# Patient Record
Sex: FEMALE | Race: WHITE | NOT HISPANIC OR LATINO | Employment: FULL TIME | URBAN - METROPOLITAN AREA
[De-identification: names, ages, dates, MRNs, and addresses within clinical notes are randomized per-mention and may not be internally consistent; named-entity substitution may affect disease eponyms.]

---

## 2019-09-04 ENCOUNTER — OFFICE VISIT (OUTPATIENT)
Dept: OBSTETRICS AND GYNECOLOGY | Facility: CLINIC | Age: 39
End: 2019-09-04

## 2019-09-04 ENCOUNTER — APPOINTMENT (OUTPATIENT)
Dept: LAB | Facility: HOSPITAL | Age: 39
End: 2019-09-04

## 2019-09-04 VITALS
HEIGHT: 65 IN | SYSTOLIC BLOOD PRESSURE: 118 MMHG | DIASTOLIC BLOOD PRESSURE: 70 MMHG | WEIGHT: 163 LBS | BODY MASS INDEX: 27.16 KG/M2

## 2019-09-04 DIAGNOSIS — N92.6 IRREGULAR PERIODS/MENSTRUAL CYCLES: ICD-10-CM

## 2019-09-04 DIAGNOSIS — Z01.419 ENCOUNTER FOR WELL WOMAN EXAM WITH ROUTINE GYNECOLOGICAL EXAM: Primary | ICD-10-CM

## 2019-09-04 DIAGNOSIS — F17.210 CIGARETTE SMOKER: ICD-10-CM

## 2019-09-04 DIAGNOSIS — N95.1 MENOPAUSAL SYMPTOMS: ICD-10-CM

## 2019-09-04 PROBLEM — J45.909 ASTHMA: Status: ACTIVE | Noted: 2019-09-04

## 2019-09-04 LAB
FSH SERPL-ACNC: 70.9 MIU/ML
LH SERPL-ACNC: 51 MIU/ML
TSH SERPL DL<=0.05 MIU/L-ACNC: 2.36 UIU/ML (ref 0.27–4.2)

## 2019-09-04 PROCEDURE — 36415 COLL VENOUS BLD VENIPUNCTURE: CPT | Performed by: OBSTETRICS & GYNECOLOGY

## 2019-09-04 PROCEDURE — 99385 PREV VISIT NEW AGE 18-39: CPT | Performed by: OBSTETRICS & GYNECOLOGY

## 2019-09-04 PROCEDURE — 84443 ASSAY THYROID STIM HORMONE: CPT | Performed by: OBSTETRICS & GYNECOLOGY

## 2019-09-04 PROCEDURE — 83002 ASSAY OF GONADOTROPIN (LH): CPT | Performed by: OBSTETRICS & GYNECOLOGY

## 2019-09-04 PROCEDURE — 83001 ASSAY OF GONADOTROPIN (FSH): CPT | Performed by: OBSTETRICS & GYNECOLOGY

## 2019-09-04 PROCEDURE — 99406 BEHAV CHNG SMOKING 3-10 MIN: CPT | Performed by: OBSTETRICS & GYNECOLOGY

## 2019-09-04 RX ORDER — CETIRIZINE HYDROCHLORIDE 10 MG/1
TABLET ORAL
Refills: 1 | COMMUNITY
Start: 2019-07-30

## 2019-09-04 RX ORDER — MONTELUKAST SODIUM 10 MG/1
TABLET ORAL
Refills: 0 | COMMUNITY
Start: 2019-07-30

## 2019-09-04 RX ORDER — VARENICLINE TARTRATE 0.5 MG/1
0.5 TABLET, FILM COATED ORAL 2 TIMES DAILY
Qty: 60 TABLET | Refills: 2 | Status: SHIPPED | OUTPATIENT
Start: 2019-09-04 | End: 2020-12-08

## 2019-09-04 RX ORDER — FLUTICASONE PROPIONATE 50 MCG
SPRAY, SUSPENSION (ML) NASAL
Refills: 6 | COMMUNITY
Start: 2019-06-20

## 2019-09-04 RX ORDER — ALBUTEROL SULFATE 90 UG/1
AEROSOL, METERED RESPIRATORY (INHALATION)
Refills: 5 | COMMUNITY
Start: 2019-07-22

## 2019-09-04 NOTE — PROGRESS NOTES
Subjective   Chief Complaint   Patient presents with   • Annual Exam     Roxi Simon is a 39 y.o. year old  presenting to be seen for her annual exam.    Current birth control method: Tubal / salpingectomy.  She lives in [facility] currently works at the [employer] very active.  She had seen Dr. Toribio in the past he did her first  section at 36 weeks in  .  She is here for annual examination but also complaining of symptomatic hot flashes night sweats insomnia.  She is had may be light flow every 6 months for the last 18 months.  Fairly light.  Reports last Pap smear was 2016 never had a mammogram.    Patient's last menstrual period was 2019.    She is sexually active.   Condoms are not typically used.    [place] is painful or she is having problems :no  She has concerns about domestic violence: no.    Cycle Frequency: irregular  infrequent   Menstrual cycle character: flow is typically light and flow is typically normal   Cycle Duration: 1 - 5   Number of heavy days of flows: 0   Intermenstrual bleeding present: no   Post-coital bleeding present: no     She exercises regularly: no.  Self breast awareness:no no family history of breast cancer.    Calcium intake: is not adequate.1  Caffeine intake: no or mild caffeine use  Social History    Tobacco Use      Smoking status: Current Every Day Smoker        Packs/day: 1.50      Smokeless tobacco: Never Used    Social History     Substance and Sexual Activity   Alcohol Use Yes   • Frequency: 4 or more times a week    Comment: 6 drinks per week 2019   She does report she has not drank much in the last month or so.  She is .  Lives in [facility] and works at the [employer] here in [facility]    The following portions of the patient's history were reviewed and updated as is  appropriate:She  has a past medical history of Anxiety, Arthritis, Asthma, Depression, and Hypertension.  She does  not have any pertinent problems on file.  She  has a past surgical history that includes  section with tubal (2016) and  section, low transverse (2010).  Her family history includes Ovarian cancer in her maternal grandmother.  She  reports that she has been smoking.  She has been smoking about 1.50 packs per day. She has never used smokeless tobacco. She reports that she drinks alcohol. She reports that she does not use drugs.  She is allergic to zofran [ondansetron hcl]..    Current Outpatient Medications:   •  cetirizine (zyrTEC) 10 MG tablet, , Disp: , Rfl: 1  •  fluticasone (FLONASE) 50 MCG/ACT nasal spray, , Disp: , Rfl: 6  •  montelukast (SINGULAIR) 10 MG tablet, , Disp: , Rfl: 0  •  VENTOLIN  (90 Base) MCG/ACT inhaler, , Disp: , Rfl: 5  •  varenicline (CHANTIX) 0.5 MG tablet, Take 1 tablet by mouth 2 (Two) Times a Day., Disp: 60 tablet, Rfl: 2    Review of systems  Constitutional    POS weight gain she reports she is gained about 43 pounds in the last 6 months without changing diet or activity.                            NEG anorexia, fevers or night sweats  Breast                POS nothing reported                            NEG persistent breast lump, skin dimpling, breast tenderness or nipple discharge  GI                      POS constipation (chronic)                            NEG bloating, change in bowel habits, melena or reflux symptoms                       POS nothing reported                            NEG dysuria, frequency or hematuria  Her GYN questionnaire was negative for STD, endometriosis cysts, fibroids sexual abuse, abnormal Pap smear or tubal infections or pregnancy.  Obstetrical history 2 vaginal deliveries followed by 2  sections  Medical history positive for asthma/COPD hypertension depression/anxiety and arthritis.  Negative diabetes, seizures, high cholesterol, thyroid  System review positive for cough constipation chest pain headaches and bruising  "negative for nosebleeds fatigue rashes sadness urinary leakage or double vision     Objective   /70   Ht 163.8 cm (64.5\")   Wt 73.9 kg (163 lb)   LMP 07/16/2019   Breastfeeding? No   BMI 27.55 kg/m²       General:  well developed; well nourished  no acute distress  appears stated age   Skin:  No suspicious lesions seen   Thyroid:  No thyromegaly   Breasts:  Examined in supine position  Symmetric without masses or skin dimpling  Nipples normal without inversion, lesions or discharge  Fibrocystic changes are present both breasts without a discrete mass   Abdomen: soft, non-tender; no masses  no umbilical or inguinal hernias are present  no hepato-splenomegaly   Pelvis: Clinical staff was present for exam  External genitalia:  normal appearance of the external genitalia including Bartholin's and [place]'s glands.  :  urethral meatus normal;  Vaginal:  normal pink mucosa without prolapse or lesions.  Cervix:  normal appearance. Pap obtained  Uterus:  normal size, shape and consistency.  Adnexa:  normal bimanual exam of the adnexa.       Lab Review   UA and Urine culture    Imaging  No data reviewed       Assessment     1. Annual GYN examination.  She is having some menopausal symptoms with hot flashes night sweats insomnia.  Pap testing done to get that caught up-to-date.  2. Irregular cycles could be postmenopausal perimenopausal or thyroid given constipation and weight gain.  Will check TSH  3. She does not do self breast examination.  Discussed she can start mammograms at age 40-45 or 50.  4. Family history of ovarian cancer maternal grandmother.  Ask her to check with her mother to see if any BRCA 1 or 2 testing has been done.  Discussed most of the time this is random and not familial.  5. 1 pack/day smoker and she is asthmatic.  Health implications discussed along with cost of cigarettes.  Spent 5 minutes discussing smoking cessation options.  She is only tried to stop on her own she is never used any " gums over-the-counter patches or Chantix.  6. She reports she has gained about 43 pounds over the last 6 months.             Plan     1. Annual examination or sooner as needed  2. 1000 mg calcium in divided doses ideally in diet; regular exercise  3. Self breast awareness if appropriate  4.    Discussed using Chantix once a day for about 5 days and twice a day.  If she needs a higher dose we can give that to her.            New Medications Ordered This Visit   Medications   • varenicline (CHANTIX) 0.5 MG tablet     Sig: Take 1 tablet by mouth 2 (Two) Times a Day.     Dispense:  60 tablet     Refill:  2     Orders Placed This Encounter   Procedures   • TSH   • Luteinizing Hormone   • Follicle Stimulating Hormone   • Ambulatory Referral to Weight Management Program     Referral Priority:   Routine     Referral Type:   Health Education     Referred to Provider:   Akua Inman APRN     Number of Visits Requested:   1           This note was electronically signed.    Jamie Wylie MD  9/4/2019

## 2019-09-05 RX ORDER — MEDROXYPROGESTERONE ACETATE 10 MG/1
10 TABLET ORAL DAILY
Qty: 12 TABLET | Refills: 0 | Status: SHIPPED | OUTPATIENT
Start: 2019-09-05 | End: 2020-12-08

## 2019-09-05 NOTE — PROGRESS NOTES
Let her know her FSH/LH were high indicating ovulation or post menopause. I'll send in provera to take for 12 days - if no withdrawal bleeding within a week of the last pill taken- she is PM  If so I would suggest hormone replacement for symptom relief at her young age, thanks

## 2019-09-10 PROBLEM — N72 HIGH RISK HUMAN PAPILLOMA VIRUS (HPV) INFECTION OF CERVIX: Status: ACTIVE | Noted: 2019-09-10

## 2019-09-10 PROBLEM — B97.7 HIGH RISK HUMAN PAPILLOMA VIRUS (HPV) INFECTION OF CERVIX: Status: ACTIVE | Noted: 2019-09-10

## 2020-12-08 ENCOUNTER — OFFICE VISIT (OUTPATIENT)
Dept: OBSTETRICS AND GYNECOLOGY | Facility: CLINIC | Age: 40
End: 2020-12-08

## 2020-12-08 VITALS
DIASTOLIC BLOOD PRESSURE: 70 MMHG | SYSTOLIC BLOOD PRESSURE: 114 MMHG | WEIGHT: 170 LBS | HEIGHT: 65 IN | BODY MASS INDEX: 28.32 KG/M2

## 2020-12-08 DIAGNOSIS — N72 HIGH RISK HUMAN PAPILLOMA VIRUS (HPV) INFECTION OF CERVIX: ICD-10-CM

## 2020-12-08 DIAGNOSIS — N92.6 IRREGULAR PERIODS/MENSTRUAL CYCLES: ICD-10-CM

## 2020-12-08 DIAGNOSIS — B97.7 HIGH RISK HUMAN PAPILLOMA VIRUS (HPV) INFECTION OF CERVIX: ICD-10-CM

## 2020-12-08 DIAGNOSIS — Z01.419 ENCOUNTER FOR GYNECOLOGICAL EXAMINATION WITHOUT ABNORMAL FINDING: Primary | ICD-10-CM

## 2020-12-08 PROCEDURE — 99396 PREV VISIT EST AGE 40-64: CPT | Performed by: OBSTETRICS & GYNECOLOGY

## 2020-12-08 RX ORDER — MEDROXYPROGESTERONE ACETATE 10 MG/1
10 TABLET ORAL DAILY
Qty: 10 TABLET | Refills: 5 | Status: SHIPPED | OUTPATIENT
Start: 2020-12-08

## 2020-12-08 RX ORDER — IPRATROPIUM BROMIDE AND ALBUTEROL SULFATE 2.5; .5 MG/3ML; MG/3ML
SOLUTION RESPIRATORY (INHALATION)
COMMUNITY
Start: 2020-11-12

## 2020-12-08 NOTE — PROGRESS NOTES
Subjective   Chief Complaint   Patient presents with   • Annual Exam     Roxi Simon is a 40 y.o. year old  presenting to be seen for her annual exam.    Current birth control method: Tubal / salpingectomy.    Patient's last menstrual period was 2020. only one the month before since I saw her last Sept she had a normal TSH level at that time and then the elevated FSH and LH.  Discussed occasionally there may be less gas cycle.    She also did not use her was not successful with Chantix.  She is a asthmatic and has an inhaler nebulizer and also smokes about a pack cigarettes per day.    She is sexually active.   Condoms are not typically used.    STDs and sexual behavior discussed.  [place] is painful or she is having problems :no  She has concerns about domestic violence: no.    Cycle Frequency: irregular  infrequent   Menstrual cycle character: flow is typically moderately heavy   Cycle Duration: 8 - 9   Number of heavy days of flows: 6   Intermenstrual bleeding present: no   Post-coital bleeding present: no     She exercises regularly: no.lungs cant handle but she smokes  Discussed maintaining healthy weight and nutrition and injury avoidance  Self breast awareness:yes    Calcium intake: is not adequate.1  Caffeine intake: caffeine use is moderate-to-high daily  Social History    Tobacco Use      Smoking status: Current Every Day Smoker        Packs/day: 0.50      Smokeless tobacco: Never Used    Social History     Substance and Sexual Activity   Alcohol Use Not Currently   • Frequency: 4 or more times a week      Discussed avoidance of illicit drugs    The following portions of the patient's history were reviewed and updated as is  appropriate:problem list, current medications, allergies, past family history, past medical history, past social history and past surgical history.    Current Outpatient Medications:   •  cetirizine (zyrTEC) 10 MG tablet, , Disp: , Rfl: 1  •  fluticasone (FLONASE)  "50 MCG/ACT nasal spray, , Disp: , Rfl: 6  •  ipratropium-albuterol (DUO-NEB) 0.5-2.5 mg/3 ml nebulizer, , Disp: , Rfl:   •  montelukast (SINGULAIR) 10 MG tablet, , Disp: , Rfl: 0  •  VENTOLIN  (90 Base) MCG/ACT inhaler, , Disp: , Rfl: 5  •  medroxyPROGESTERone (Provera) 10 MG tablet, Take 1 tablet by mouth Daily. 1 nightly cycle day 16 through 25, Disp: 10 tablet, Rfl: 5    Discussed risk factors for hypertension, hyperlipidemia coronary heart disease.    Review of systems    Constitutional    POS weight gain                            NEG anorexia, chills, malaise or night sweats  Breast                POS nothing reported                            NEG persistent breast lump, skin dimpling, breast tenderness or nipple discharge  GI                      POS constipation (chronic) versus diarrhea                            NEG bloating, change in bowel habits, melena or reflux symptoms                       POS nothing reported                            NEG dysuria, frequency or hematuria         Objective   /70   Ht 163.8 cm (64.5\")   Wt 77.1 kg (170 lb)   LMP 11/26/2020   Breastfeeding No   BMI 28.73 kg/m²       General:  well developed; well nourished  no acute distress  appears stated age   Skin:  No suspicious lesions seen   Thyroid:    Breasts:  Examined in supine position  Symmetric without masses or skin dimpling  Nipples normal without inversion, lesions or discharge  Fibrocystic changes are present both breasts without a discrete mass   Abdomen: soft, non-tender; no masses  no umbilical or inguinal hernias are present  no hepato-splenomegaly   Pelvis: Clinical staff was present for exam  External genitalia:  normal appearance of the external genitalia including Bartholin's and [place]'s glands.  :  urethral meatus normal;  Vaginal:  normal pink mucosa without prolapse or lesions.  Uterus:  normal size, shape and consistency. anteverted;  Adnexa:  normal bimanual exam of the " adnexa.  Rectal:  digital rectal exam not performed; anus visually normal appearing.       Lab Review   FSH, Pap test and TSH    Imaging  No data reviewed               Assessment     1. Irregular cycles  2. History of nonsixteen/18 high risk HPV.  Suggested repeating 2022  3. History of asthma 1 pack/day smoker             Plan     1. Annual examination or sooner as needed  2. Provera cycle days 16 through 25 chart cycles; she will call if she has any irregular bleeding we will order an ultrasound and/or endometrial biopsy  3. 1000 mg calcium in divided doses ideally in diet; regular exercise  4. Self breast awareness discussed, mammogram protocols discussed  4.    Quit smoking cigarettes            New Medications Ordered This Visit   Medications   • medroxyPROGESTERone (Provera) 10 MG tablet     Sig: Take 1 tablet by mouth Daily. 1 nightly cycle day 16 through 25     Dispense:  10 tablet     Refill:  5     No orders of the defined types were placed in this encounter.          This note was electronically signed.    Jamie Wylie MD  12/8/2020

## 2022-01-24 ENCOUNTER — LAB (OUTPATIENT)
Dept: LAB | Facility: HOSPITAL | Age: 42
End: 2022-01-24

## 2022-01-24 ENCOUNTER — OFFICE VISIT (OUTPATIENT)
Dept: OBSTETRICS AND GYNECOLOGY | Facility: CLINIC | Age: 42
End: 2022-01-24

## 2022-01-24 VITALS
HEIGHT: 66 IN | DIASTOLIC BLOOD PRESSURE: 80 MMHG | BODY MASS INDEX: 28.51 KG/M2 | WEIGHT: 177.4 LBS | SYSTOLIC BLOOD PRESSURE: 116 MMHG

## 2022-01-24 DIAGNOSIS — B97.7 HPV IN FEMALE: ICD-10-CM

## 2022-01-24 DIAGNOSIS — Z01.411 ENCOUNTER FOR GYNECOLOGICAL EXAMINATION WITH ABNORMAL FINDING: Primary | ICD-10-CM

## 2022-01-24 DIAGNOSIS — Z12.31 ENCOUNTER FOR SCREENING MAMMOGRAM FOR MALIGNANT NEOPLASM OF BREAST: ICD-10-CM

## 2022-01-24 DIAGNOSIS — Z01.411 ENCOUNTER FOR GYNECOLOGICAL EXAMINATION WITH ABNORMAL FINDING: ICD-10-CM

## 2022-01-24 DIAGNOSIS — R63.5 WEIGHT GAIN: ICD-10-CM

## 2022-01-24 DIAGNOSIS — N95.0 PMB (POSTMENOPAUSAL BLEEDING): ICD-10-CM

## 2022-01-24 LAB
ALBUMIN SERPL-MCNC: 4.7 G/DL (ref 3.5–5.2)
ALBUMIN/GLOB SERPL: 1.7 G/DL
ALP SERPL-CCNC: 122 U/L (ref 39–117)
ALT SERPL W P-5'-P-CCNC: 41 U/L (ref 1–33)
ANION GAP SERPL CALCULATED.3IONS-SCNC: 10.2 MMOL/L (ref 5–15)
AST SERPL-CCNC: 19 U/L (ref 1–32)
BILIRUB SERPL-MCNC: 0.3 MG/DL (ref 0–1.2)
BUN SERPL-MCNC: 17 MG/DL (ref 6–20)
BUN/CREAT SERPL: 20.7 (ref 7–25)
CALCIUM SPEC-SCNC: 9.6 MG/DL (ref 8.6–10.5)
CHLORIDE SERPL-SCNC: 100 MMOL/L (ref 98–107)
CO2 SERPL-SCNC: 28.8 MMOL/L (ref 22–29)
CREAT SERPL-MCNC: 0.82 MG/DL (ref 0.57–1)
DEPRECATED RDW RBC AUTO: 41.6 FL (ref 37–54)
ERYTHROCYTE [DISTWIDTH] IN BLOOD BY AUTOMATED COUNT: 12 % (ref 12.3–15.4)
ESTRADIOL SERPL HS-MCNC: 69.3 PG/ML
FSH SERPL-ACNC: 23.7 MIU/ML
GFR SERPL CREATININE-BSD FRML MDRD: 77 ML/MIN/1.73
GLOBULIN UR ELPH-MCNC: 2.8 GM/DL
GLUCOSE SERPL-MCNC: 88 MG/DL (ref 65–99)
HCT VFR BLD AUTO: 46.1 % (ref 34–46.6)
HGB BLD-MCNC: 15.8 G/DL (ref 12–15.9)
MCH RBC QN AUTO: 32.6 PG (ref 26.6–33)
MCHC RBC AUTO-ENTMCNC: 34.3 G/DL (ref 31.5–35.7)
MCV RBC AUTO: 95.1 FL (ref 79–97)
PLATELET # BLD AUTO: 454 10*3/MM3 (ref 140–450)
PMV BLD AUTO: 10 FL (ref 6–12)
POTASSIUM SERPL-SCNC: 3.8 MMOL/L (ref 3.5–5.2)
PROT SERPL-MCNC: 7.5 G/DL (ref 6–8.5)
RBC # BLD AUTO: 4.85 10*6/MM3 (ref 3.77–5.28)
SODIUM SERPL-SCNC: 139 MMOL/L (ref 136–145)
TSH SERPL DL<=0.05 MIU/L-ACNC: 3.73 UIU/ML (ref 0.27–4.2)
VIT B12 BLD-MCNC: 514 PG/ML (ref 211–946)
WBC NRBC COR # BLD: 12.21 10*3/MM3 (ref 3.4–10.8)

## 2022-01-24 PROCEDURE — 84443 ASSAY THYROID STIM HORMONE: CPT

## 2022-01-24 PROCEDURE — 82670 ASSAY OF TOTAL ESTRADIOL: CPT

## 2022-01-24 PROCEDURE — 36415 COLL VENOUS BLD VENIPUNCTURE: CPT

## 2022-01-24 PROCEDURE — 83001 ASSAY OF GONADOTROPIN (FSH): CPT

## 2022-01-24 PROCEDURE — 99396 PREV VISIT EST AGE 40-64: CPT | Performed by: NURSE PRACTITIONER

## 2022-01-24 PROCEDURE — 80053 COMPREHEN METABOLIC PANEL: CPT

## 2022-01-24 PROCEDURE — 85027 COMPLETE CBC AUTOMATED: CPT

## 2022-01-24 PROCEDURE — 82607 VITAMIN B-12: CPT

## 2022-01-24 RX ORDER — DEXTROMETHORPHAN HYDROBROMIDE AND PROMETHAZINE HYDROCHLORIDE 15; 6.25 MG/5ML; MG/5ML
SYRUP ORAL
COMMUNITY
Start: 2021-12-23

## 2022-01-24 RX ORDER — DILTIAZEM HYDROCHLORIDE 60 MG/1
TABLET, FILM COATED ORAL
COMMUNITY
Start: 2022-01-15

## 2022-01-24 NOTE — PROGRESS NOTES
"  Annual Visit     Patient Name: Roxi Simon  : 1980   MRN: [id number]   Care Team: Patient Care Team:  Provider, No Known as PCP - General    Chief Complaint:    Chief Complaint   Patient presents with   • Annual Exam     no c/o       HPI: Roxi Simon is a 41 y.o. year old  presenting to be seen for her gynecologic exam.   S/p BTL     Smoker     States she hadn't had a period in 1.5 yrs until  - she had period like flow with clots x 5 days   FSH in 2019 = 70.9   Hasn't had labs in the last year   No pelvic pain, dyspareunia, or postcoital bldg   No vaginal c/o     Pap smear  WNL with HPV non 16/18 positive     Hasn't had a mammogram yet     Concerned about weight gain   States she has gained about 60 lbs in the last 2 yrs  States even if she is very careful with her intake, she still gains weight     Subjective      /80 (BP Location: Right arm, Patient Position: Sitting, Cuff Size: Adult)   Ht 167.6 cm (66\")   Wt 80.5 kg (177 lb 6.4 oz)   Breastfeeding No   BMI 28.63 kg/m²     BMI reviewed: Body mass index is 28.63 kg/m².      Objective     Physical Exam    Neuro: alert and oriented to person, place and time   General:  alert; cooperative; well developed; well nourished   Skin:  No suspicious lesions seen   Thyroid: normal to inspection and palpation   Lungs:  breathing is unlabored  clear to auscultation bilaterally   Heart:  regular rate and rhythm, S1, S2 normal, no murmur, click, rub or gallop  normal apical impulse   Breasts:  Examined in supine position  Symmetric without masses or skin dimpling  Nipples normal without inversion, lesions or discharge  There are no palpable axillary nodes   Abdomen: soft, non-tender; no masses  no umbilical or inguinal hernias are present  no hepato-splenomegaly   Pelvis: Clinical staff was present for exam  External genitalia:  normal appearance of the external genitalia including Bartholin's and [place]'s glands.  :  urethral meatus " normal;  Vaginal:  normal pink mucosa without prolapse or lesions.  Cervix:  normal appearance.  Uterus:  normal size, shape and consistency. tenderness to palpation is present;  Adnexa:  normal bimanual exam of the adnexa.  Rectal:  digital rectal exam not performed; anus visually normal appearing.         Assessment / Plan      Assessment  Problems Addressed This Visit    ICD-10-CM ICD-9-CM   1. Encounter for gynecological examination with abnormal finding  Z01.411 V72.31   2. 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 PMB (postmenopausal bleeding)  N95.0 627.1   4. Weight gain  R63.5 783.1   5. Encounter for screening mammogram for malignant neoplasm of breast  Z12.31 V76.12       Plan    Pap smear pending   Reviewed pap smear results from 2019     Discussed monthly SBEs   Mammogram ordered    Reviewed menopausal FSH from 2019   And 12 consecutive months of amenorrhea confirms menopause, so she should no longer have bldg - this is considered PMB and must be evaluated   Pelvic u/s ordered with labs   FSH, estradiol, TSH ordered   CBC and CMP ordered as well     Check B12 d/t concerns of weight gain     Will call to discuss pelvic u/s report and lab results and determine final POC     AV 1 yr           Follow Up  Return in about 1 year (around 1/24/2023) for Annual physical.  Patient was given instructions and counseling regarding her condition or for health maintenance advice. Please see specific information pulled into the AVS if appropriate.     POLA Pruitt  January 24, 2022  16:08 EST

## 2022-01-25 RX ORDER — MISOPROSTOL 100 UG/1
TABLET ORAL
Qty: 2 TABLET | Refills: 0 | Status: SHIPPED | OUTPATIENT
Start: 2022-01-25 | End: 2022-01-26 | Stop reason: SDUPTHER

## 2022-01-26 ENCOUNTER — TELEPHONE (OUTPATIENT)
Dept: OBSTETRICS AND GYNECOLOGY | Facility: CLINIC | Age: 42
End: 2022-01-26

## 2022-01-26 RX ORDER — MISOPROSTOL 100 UG/1
TABLET ORAL
Qty: 2 TABLET | Refills: 0 | Status: SHIPPED | OUTPATIENT
Start: 2022-01-26 | End: 2022-01-27

## 2022-01-26 NOTE — TELEPHONE ENCOUNTER
----- Message from POLA Pruitt sent at 1/25/2022  5:27 PM EST -----  S/w pt to discuss results - labs perimenopausal now, which means she has ovulated again and this is cause of bldg.  Sometimes this happens even after menopause.  U/s shows thickened lining - needs EMB - procedure described to pt and she is agreeable.  Cytotec to pharmacy.      Discussed elevated WBC and liver enzyme and alkaline phosphatase - she will f/u for rpt labs with PCP.  States she is feeling well - no respiratory, GI, or urinary c/o.  She is a daily smoker.      Jolene,   Can you please call her to schedule an appt for EMB?  She is aware of the plan    Thanks!

## 2022-01-27 ENCOUNTER — OFFICE VISIT (OUTPATIENT)
Dept: OBSTETRICS AND GYNECOLOGY | Facility: CLINIC | Age: 42
End: 2022-01-27

## 2022-01-27 VITALS
WEIGHT: 174 LBS | BODY MASS INDEX: 28.08 KG/M2 | DIASTOLIC BLOOD PRESSURE: 70 MMHG | SYSTOLIC BLOOD PRESSURE: 118 MMHG | RESPIRATION RATE: 16 BRPM

## 2022-01-27 DIAGNOSIS — R93.89 THICKENED ENDOMETRIUM: ICD-10-CM

## 2022-01-27 DIAGNOSIS — N95.0 PMB (POSTMENOPAUSAL BLEEDING): Primary | ICD-10-CM

## 2022-01-27 PROCEDURE — 99213 OFFICE O/P EST LOW 20 MIN: CPT | Performed by: NURSE PRACTITIONER

## 2022-01-27 PROCEDURE — 58100 BIOPSY OF UTERUS LINING: CPT | Performed by: NURSE PRACTITIONER

## 2022-01-27 NOTE — PROGRESS NOTES
Problem Visit     Patient Name: Roxi Simon  : 1980   MRN: [id number]   Care Team: Patient Care Team:  Provider, No Known as PCP - General    Chief Complaint:    Chief Complaint   Patient presents with   • Biopsy     endometrial biopsy       HPI: Roxi Siomn is a 41 y.o. year old  presenting to be seen for EMB.  S/p BTL      Smoker      States she hadn't had a period in 1.5 yrs until  - she had period like flow with clots x 5 days   FSH in 2019 = 70.9   No pelvic pain, dyspareunia, or postcoital bldg   No vaginal c/o     Labs and pelvic u/s done 22  FSH 23.7  Estradiol 69.3  TSH WNL   U/s with thickened EMS 7.7mm   Right ovarian simple cyst - 2.5cm at largest measurement   Left ovary WNL     Denies questions/concerns re: procedure today    Subjective      /70   Resp 16   Wt 78.9 kg (174 lb)   LMP 01/10/2022   Breastfeeding No   BMI 28.08 kg/m²     BMI reviewed: Body mass index is 28.08 kg/m².      Objective     Physical Exam      Neuro: alert and oriented to person, place and time   General:  alert; cooperative; well developed; well nourished   Skin:  Not performed.   Thyroid: not examined   Lungs:  breathing is unlabored   Heart:  Not performed.   Breasts:  Not performed.   Abdomen: Not performed.   Pelvis: Clinical staff was present for exam  External genitalia:  normal appearance of the external genitalia including Bartholin's and [place]'s glands.  :  urethral meatus normal;  Vaginal:  normal pink mucosa without prolapse or lesions.  Cervix:  normal appearance.  Uterus:  normal size, shape and consistency.     Pt gives consent for procedure today. Speculum placed in vagina and cervix and vaginal walls prepped with betadine x 3.  Cervical os dilator used.  Endometrial Pipelle introduced and uterus sounded to 7cm.  2 passes performed.  Adequate tissue was obtained. Light bleeding noted from os.  Pt tolerated procedure well.      Assessment / Plan       Assessment  Problems Addressed This Visit    ICD-10-CM ICD-9-CM   1. PMB (postmenopausal bleeding)  N95.0 627.1   2. Thickened endometrium  R93.89 793.5       Plan    EMB pending   Will call with pathology results and final POC           Follow Up  Return for WIll call with results and plan .  Patient was given instructions and counseling regarding her condition or for health maintenance advice. Please see specific information pulled into the AVS if appropriate.     Estefani Ford, POLA  January 27, 2022  11:12 EST

## 2022-01-31 ENCOUNTER — TELEPHONE (OUTPATIENT)
Dept: OBSTETRICS AND GYNECOLOGY | Facility: CLINIC | Age: 42
End: 2022-01-31

## 2022-01-31 DIAGNOSIS — N95.0 PMB (POSTMENOPAUSAL BLEEDING): Primary | ICD-10-CM

## 2022-01-31 DIAGNOSIS — R93.89 THICKENED ENDOMETRIUM: ICD-10-CM

## 2022-01-31 NOTE — TELEPHONE ENCOUNTER
----- Message from POLA Pruitt sent at 1/31/2022 12:21 PM EST -----  S/w pt to discuss EMB returned benign.    Will rpt pelvic u/s to check EMS in 3 months, to be sure it has returned to 4mm or less as it should be after menopause.  She will call with any bldg before then - pt v/u.      Jolene,   Can you please call and get her schedule for a rpt pelvic u/s in 3 months?  I will call her to discuss the report.  She is aware of the plan.     Thanks!

## 2022-03-15 ENCOUNTER — HOSPITAL ENCOUNTER (OUTPATIENT)
Dept: MAMMOGRAPHY | Facility: HOSPITAL | Age: 42
Discharge: HOME | End: 2022-03-15
Admitting: NURSE PRACTITIONER

## 2022-03-15 DIAGNOSIS — Z12.31 ENCOUNTER FOR SCREENING MAMMOGRAM FOR MALIGNANT NEOPLASM OF BREAST: ICD-10-CM

## 2022-03-15 PROCEDURE — 77063 BREAST TOMOSYNTHESIS BI: CPT

## 2022-03-15 PROCEDURE — 77063 BREAST TOMOSYNTHESIS BI: CPT | Performed by: RADIOLOGY

## 2022-03-15 PROCEDURE — 77067 SCR MAMMO BI INCL CAD: CPT | Performed by: RADIOLOGY

## 2022-03-15 PROCEDURE — 77067 SCR MAMMO BI INCL CAD: CPT

## 2022-04-19 ENCOUNTER — HOSPITAL ENCOUNTER (EMERGENCY)
Facility: HOSPITAL | Age: 42
Discharge: HOME | End: 2022-04-19
Attending: EMERGENCY MEDICINE | Admitting: EMERGENCY MEDICINE

## 2022-04-19 ENCOUNTER — APPOINTMENT (OUTPATIENT)
Dept: GENERAL RADIOLOGY | Facility: HOSPITAL | Age: 42
End: 2022-04-19

## 2022-04-19 VITALS
WEIGHT: 178 LBS | TEMPERATURE: 98.2 F | OXYGEN SATURATION: 95 % | DIASTOLIC BLOOD PRESSURE: 73 MMHG | SYSTOLIC BLOOD PRESSURE: 121 MMHG | HEART RATE: 72 BPM | RESPIRATION RATE: 18 BRPM | HEIGHT: 66 IN | BODY MASS INDEX: 28.61 KG/M2

## 2022-04-19 DIAGNOSIS — R07.9 CHEST PAIN, UNSPECIFIED TYPE: Primary | ICD-10-CM

## 2022-04-19 DIAGNOSIS — B37.0 ORAL CANDIDIASIS: ICD-10-CM

## 2022-04-19 DIAGNOSIS — Z87.09 HISTORY OF ASTHMA: ICD-10-CM

## 2022-04-19 LAB
ALBUMIN SERPL-MCNC: 4.7 G/DL (ref 3.5–5.2)
ALBUMIN/GLOB SERPL: 2 G/DL
ALP SERPL-CCNC: 122 U/L (ref 39–117)
ALT SERPL W P-5'-P-CCNC: 33 U/L (ref 1–33)
ANION GAP SERPL CALCULATED.3IONS-SCNC: 12 MMOL/L (ref 5–15)
AST SERPL-CCNC: 22 U/L (ref 1–32)
BASOPHILS # BLD AUTO: 0.05 10*3/MM3 (ref 0–0.2)
BASOPHILS NFR BLD AUTO: 0.5 % (ref 0–1.5)
BILIRUB SERPL-MCNC: 0.3 MG/DL (ref 0–1.2)
BUN SERPL-MCNC: 17 MG/DL (ref 6–20)
BUN/CREAT SERPL: 20.5 (ref 7–25)
CALCIUM SPEC-SCNC: 9.3 MG/DL (ref 8.6–10.5)
CHLORIDE SERPL-SCNC: 99 MMOL/L (ref 98–107)
CO2 SERPL-SCNC: 27 MMOL/L (ref 22–29)
CREAT SERPL-MCNC: 0.83 MG/DL (ref 0.57–1)
DEPRECATED RDW RBC AUTO: 40.9 FL (ref 37–54)
EGFRCR SERPLBLD CKD-EPI 2021: 91 ML/MIN/1.73
EOSINOPHIL # BLD AUTO: 0.19 10*3/MM3 (ref 0–0.4)
EOSINOPHIL NFR BLD AUTO: 1.8 % (ref 0.3–6.2)
ERYTHROCYTE [DISTWIDTH] IN BLOOD BY AUTOMATED COUNT: 12 % (ref 12.3–15.4)
GLOBULIN UR ELPH-MCNC: 2.4 GM/DL
GLUCOSE SERPL-MCNC: 104 MG/DL (ref 65–99)
HCG SERPL QL: NEGATIVE
HCT VFR BLD AUTO: 42.5 % (ref 34–46.6)
HGB BLD-MCNC: 15 G/DL (ref 12–15.9)
HOLD SPECIMEN: NORMAL
IMM GRANULOCYTES # BLD AUTO: 0.03 10*3/MM3 (ref 0–0.05)
IMM GRANULOCYTES NFR BLD AUTO: 0.3 % (ref 0–0.5)
LYMPHOCYTES # BLD AUTO: 3.19 10*3/MM3 (ref 0.7–3.1)
LYMPHOCYTES NFR BLD AUTO: 30.9 % (ref 19.6–45.3)
MCH RBC QN AUTO: 32.8 PG (ref 26.6–33)
MCHC RBC AUTO-ENTMCNC: 35.3 G/DL (ref 31.5–35.7)
MCV RBC AUTO: 92.8 FL (ref 79–97)
MONOCYTES # BLD AUTO: 0.65 10*3/MM3 (ref 0.1–0.9)
MONOCYTES NFR BLD AUTO: 6.3 % (ref 5–12)
NEUTROPHILS NFR BLD AUTO: 6.21 10*3/MM3 (ref 1.7–7)
NEUTROPHILS NFR BLD AUTO: 60.2 % (ref 42.7–76)
NRBC BLD AUTO-RTO: 0 /100 WBC (ref 0–0.2)
PLATELET # BLD AUTO: 363 10*3/MM3 (ref 140–450)
PMV BLD AUTO: 9.6 FL (ref 6–12)
POTASSIUM SERPL-SCNC: 4.1 MMOL/L (ref 3.5–5.2)
PROT SERPL-MCNC: 7.1 G/DL (ref 6–8.5)
RBC # BLD AUTO: 4.58 10*6/MM3 (ref 3.77–5.28)
SODIUM SERPL-SCNC: 138 MMOL/L (ref 136–145)
TROPONIN T SERPL-MCNC: <0.01 NG/ML (ref 0–0.03)
WBC NRBC COR # BLD: 10.32 10*3/MM3 (ref 3.4–10.8)
WHOLE BLOOD HOLD SPECIMEN: NORMAL
WHOLE BLOOD HOLD SPECIMEN: NORMAL

## 2022-04-19 PROCEDURE — 84484 ASSAY OF TROPONIN QUANT: CPT

## 2022-04-19 PROCEDURE — 94799 UNLISTED PULMONARY SVC/PX: CPT

## 2022-04-19 PROCEDURE — 71045 X-RAY EXAM CHEST 1 VIEW: CPT

## 2022-04-19 PROCEDURE — 94640 AIRWAY INHALATION TREATMENT: CPT

## 2022-04-19 PROCEDURE — 93005 ELECTROCARDIOGRAM TRACING: CPT

## 2022-04-19 PROCEDURE — 99283 EMERGENCY DEPT VISIT LOW MDM: CPT

## 2022-04-19 PROCEDURE — 94761 N-INVAS EAR/PLS OXIMETRY MLT: CPT

## 2022-04-19 PROCEDURE — 84703 CHORIONIC GONADOTROPIN ASSAY: CPT

## 2022-04-19 PROCEDURE — 85025 COMPLETE CBC W/AUTO DIFF WBC: CPT

## 2022-04-19 PROCEDURE — 80053 COMPREHEN METABOLIC PANEL: CPT

## 2022-04-19 RX ORDER — IPRATROPIUM BROMIDE AND ALBUTEROL SULFATE 2.5; .5 MG/3ML; MG/3ML
3 SOLUTION RESPIRATORY (INHALATION) ONCE
Status: COMPLETED | OUTPATIENT
Start: 2022-04-19 | End: 2022-04-19

## 2022-04-19 RX ORDER — ASPIRIN 325 MG
325 TABLET ORAL ONCE
Status: COMPLETED | OUTPATIENT
Start: 2022-04-19 | End: 2022-04-19

## 2022-04-19 RX ORDER — SODIUM CHLORIDE 0.9 % (FLUSH) 0.9 %
10 SYRINGE (ML) INJECTION AS NEEDED
Status: DISCONTINUED | OUTPATIENT
Start: 2022-04-19 | End: 2022-04-19 | Stop reason: HOSPADM

## 2022-04-19 RX ADMIN — ASPIRIN 325 MG ORAL TABLET 325 MG: 325 PILL ORAL at 16:22

## 2022-04-19 RX ADMIN — IPRATROPIUM BROMIDE AND ALBUTEROL SULFATE 3 ML: .5; 3 SOLUTION RESPIRATORY (INHALATION) at 15:58
